# Patient Record
Sex: MALE | Race: BLACK OR AFRICAN AMERICAN | NOT HISPANIC OR LATINO | URBAN - NONMETROPOLITAN AREA
[De-identification: names, ages, dates, MRNs, and addresses within clinical notes are randomized per-mention and may not be internally consistent; named-entity substitution may affect disease eponyms.]

---

## 2017-09-13 PROBLEM — H52.4: Noted: 2019-07-25

## 2017-09-13 PROBLEM — H16.223: Noted: 2017-09-13

## 2019-07-25 ENCOUNTER — IMPORTED ENCOUNTER (OUTPATIENT)
Dept: URBAN - NONMETROPOLITAN AREA CLINIC 1 | Facility: CLINIC | Age: 69
End: 2019-07-25

## 2019-07-25 PROCEDURE — 99213 OFFICE O/P EST LOW 20 MIN: CPT

## 2019-07-25 NOTE — PATIENT DISCUSSION
"Cataract(s)-Visually significant.-Cataract(s) causing symptomatic impairment of visual function not correctable with a tolerable change in glasses or contact lenses lighting or non-operative means resulting in specific activity limitations and/or participation restrictions including but not limited to reading viewing television driving or meeting vocational or recreational needs. -Expectation is clearer vision and reduced glare disability after cataract removal.-Refer to Dr Neel Sanchez for cataract evaluationEpiretinal membrane OS -Discussed findings of exam in detail with the patient.-Discussed the signs of worsening of the disease. Parmjit Villareal ""Glaucoma Suspect-Based on IOP's. - IOP 19:20 07/25/19- C/D's 0.3 OD 0.2 OS -Appears stable at this time.-Continue to monitor with exams and testing. K-Sicca -Explained LADARIUS and associated symptoms.-Recommend increasing Omega 3s.-Pt to begin artificial tears OU QID PRN. Pt will contact us if this does not provide relief.  Consider punctal plugs in that case.; 's Notes: Mac OCT- 9/13/17"

## 2022-04-10 ASSESSMENT — TONOMETRY
OS_IOP_MMHG: 20
OD_IOP_MMHG: 19

## 2022-04-10 ASSESSMENT — VISUAL ACUITY
OD_CC: 20/40-
OS_CC: 20/30-2